# Patient Record
Sex: MALE | Race: WHITE | HISPANIC OR LATINO | Employment: UNEMPLOYED | URBAN - METROPOLITAN AREA
[De-identification: names, ages, dates, MRNs, and addresses within clinical notes are randomized per-mention and may not be internally consistent; named-entity substitution may affect disease eponyms.]

---

## 2019-11-30 ENCOUNTER — OFFICE VISIT (OUTPATIENT)
Dept: URGENT CARE | Facility: PHYSICIAN GROUP | Age: 19
End: 2019-11-30
Payer: COMMERCIAL

## 2019-11-30 VITALS
HEIGHT: 70 IN | DIASTOLIC BLOOD PRESSURE: 76 MMHG | WEIGHT: 180 LBS | TEMPERATURE: 98.8 F | RESPIRATION RATE: 16 BRPM | SYSTOLIC BLOOD PRESSURE: 108 MMHG | HEART RATE: 92 BPM | BODY MASS INDEX: 25.77 KG/M2 | OXYGEN SATURATION: 98 %

## 2019-11-30 DIAGNOSIS — B07.9 VIRAL WARTS, UNSPECIFIED TYPE: ICD-10-CM

## 2019-11-30 DIAGNOSIS — K29.00 ACUTE GASTRITIS, PRESENCE OF BLEEDING UNSPECIFIED, UNSPECIFIED GASTRITIS TYPE: ICD-10-CM

## 2019-11-30 PROCEDURE — 99202 OFFICE O/P NEW SF 15 MIN: CPT | Performed by: PHYSICIAN ASSISTANT

## 2019-11-30 RX ORDER — FAMOTIDINE 20 MG/1
20 TABLET, FILM COATED ORAL 2 TIMES DAILY
Qty: 60 TAB | Refills: 0 | Status: SHIPPED | OUTPATIENT
Start: 2019-11-30

## 2019-11-30 ASSESSMENT — ENCOUNTER SYMPTOMS
BACK PAIN: 0
MYALGIAS: 0
SORE THROAT: 0
SINUS PAIN: 0
ABDOMINAL PAIN: 0
DIARRHEA: 1
VOMITING: 1
FEVER: 0
BLOOD IN STOOL: 1
COUGH: 0
PALPITATIONS: 0
HEARTBURN: 1
HEADACHES: 0
CHILLS: 0
DIZZINESS: 0
FLANK PAIN: 0
SPUTUM PRODUCTION: 0
CONSTIPATION: 0
DOUBLE VISION: 0
BLURRED VISION: 0
NAUSEA: 1
SHORTNESS OF BREATH: 0

## 2019-11-30 NOTE — PROGRESS NOTES
Subjective:      Denzel Andrews Jr. is a 19 y.o. male who presents with Emesis (Possible acid reflux causing him to throw up bile after eating onoing several weeks.)            HPI   18 y/o male presents to urgent care with new problem of intermittent nausea and vomiting onset about 3 weeks ago.  Patient denies abdominal pain.  Reports symptoms are worse with eating fatty/greasy foods. Denies hx of similar.   Reports hx of intermittent dark red blood in stools. Denies recent travel outside the [place], camping, or drinking from streams.   Denies abdominal pain. Denies hx of abdominal surgery.   Family hx of pancreatic cancer.   Denies other associated aggravating or alleviating factors.     Review of Systems   Constitutional: Negative for chills, fever and malaise/fatigue.   HENT: Negative for ear discharge, ear pain, sinus pain and sore throat.    Eyes: Negative for blurred vision and double vision.   Respiratory: Negative for cough, sputum production and shortness of breath.    Cardiovascular: Negative for chest pain and palpitations.   Gastrointestinal: Positive for blood in stool, diarrhea, heartburn, nausea and vomiting. Negative for abdominal pain, constipation and melena.   Genitourinary: Negative for dysuria, flank pain, frequency, hematuria and urgency.   Musculoskeletal: Negative for back pain and myalgias.   Skin: Negative for rash.   Neurological: Negative for dizziness and headaches.   Endo/Heme/Allergies: Negative for environmental allergies.     History reviewed. No pertinent past medical history.  No current outpatient medications on file prior to visit.     No current facility-administered medications on file prior to visit.      No Known Allergies  Social History     Tobacco Use   • Smoking status: Never Smoker   • Smokeless tobacco: Never Used   Substance Use Topics   • Alcohol use: Not on file      Objective:     /76   Pulse 92   Temp 37.1 °C (98.8 °F) (Temporal)   Resp 16   Ht 1.778 m  "(5' 10\")   Wt 81.6 kg (180 lb)   SpO2 98%   BMI 25.83 kg/m²      Physical Exam  Vitals signs reviewed.   Constitutional:       Appearance: He is well-developed.   HENT:      Head: Normocephalic and atraumatic.      Nose: Nose normal.      Mouth/Throat:      Mouth: Mucous membranes are moist.      Pharynx: Oropharynx is clear.   Eyes:      Conjunctiva/sclera: Conjunctivae normal.   Neck:      Musculoskeletal: Normal range of motion and neck supple.   Cardiovascular:      Rate and Rhythm: Normal rate and regular rhythm.      Pulses: Normal pulses.      Heart sounds: Normal heart sounds.   Pulmonary:      Effort: Pulmonary effort is normal. No respiratory distress.      Breath sounds: Normal breath sounds.   Abdominal:      General: Abdomen is flat. Bowel sounds are normal. There is no distension.      Palpations: Abdomen is soft. There is no hepatomegaly, splenomegaly or mass.      Tenderness: There is no tenderness. There is no right CVA tenderness, left CVA tenderness or guarding. Negative signs include McBurney's sign.   Genitourinary:     Comments: Patient defers rectal exam for guaiac testing  Skin:     General: Skin is warm and dry.      Capillary Refill: Capillary refill takes less than 2 seconds.   Neurological:      General: No focal deficit present.      Mental Status: He is alert and oriented to person, place, and time.   Psychiatric:         Mood and Affect: Mood normal.         Behavior: Behavior normal.         Thought Content: Thought content normal.         Judgment: Judgment normal.               Assessment/Plan:     1. Acute gastritis, presence of bleeding unspecified, unspecified gastritis type  famotidine (PEPCID) 20 MG Tab   2. Viral warts, unspecified type  REFERRAL TO DERMATOLOGY     Patient advised to stay away from trigger foods, alcohol, and caffeine.  Do not eat within 3 hours of going to bed.  No NSAIDs.  PT should follow up with PCP in 1-2 days for re-evaluation if symptoms have not " improved.  Discussed red flags and reasons to return to UC or ED.  Pt and/or family verbalized understanding of diagnosis and follow up instructions and was offered informational handout on diagnosis.  PT discharged.

## 2019-12-10 ENCOUNTER — APPOINTMENT (RX ONLY)
Dept: URBAN - METROPOLITAN AREA CLINIC 22 | Facility: CLINIC | Age: 19
Setting detail: DERMATOLOGY
End: 2019-12-10

## 2019-12-10 DIAGNOSIS — D22 MELANOCYTIC NEVI: ICD-10-CM

## 2019-12-10 DIAGNOSIS — B07.8 OTHER VIRAL WARTS: ICD-10-CM

## 2019-12-10 DIAGNOSIS — Z71.89 OTHER SPECIFIED COUNSELING: ICD-10-CM

## 2019-12-10 PROBLEM — D22.5 MELANOCYTIC NEVI OF TRUNK: Status: ACTIVE | Noted: 2019-12-10

## 2019-12-10 PROCEDURE — ? COUNSELING

## 2019-12-10 PROCEDURE — ? LIQUID NITROGEN

## 2019-12-10 PROCEDURE — 17111 DESTRUCTION B9 LESIONS 15/>: CPT

## 2019-12-10 PROCEDURE — 99202 OFFICE O/P NEW SF 15 MIN: CPT | Mod: 25

## 2019-12-10 ASSESSMENT — LOCATION DETAILED DESCRIPTION DERM
LOCATION DETAILED: RIGHT DISTAL DORSAL INDEX FINGER
LOCATION DETAILED: LEFT LATERAL DORSAL FOOT
LOCATION DETAILED: LEFT MEDIAL SUPERIOR CHEST
LOCATION DETAILED: LEFT LATERAL PLANTAR MIDFOOT
LOCATION DETAILED: LEFT DISTAL RADIAL THUMB
LOCATION DETAILED: PERIUNGUAL SKIN RIGHT INDEX FINGER
LOCATION DETAILED: LEFT 5TH TOENAIL
LOCATION DETAILED: LEFT THENAR EMINENCE
LOCATION DETAILED: RIGHT LATERAL PLANTAR MIDFOOT
LOCATION DETAILED: LEFT INFERIOR UPPER BACK
LOCATION DETAILED: RIGHT DISTAL PALMAR INDEX FINGER
LOCATION DETAILED: RIGHT ULNAR PALM
LOCATION DETAILED: LEFT DORSAL 5TH TOE
LOCATION DETAILED: LEFT DORSAL 4TH TOE

## 2019-12-10 ASSESSMENT — LOCATION SIMPLE DESCRIPTION DERM
LOCATION SIMPLE: LEFT UPPER BACK
LOCATION SIMPLE: LEFT THUMB
LOCATION SIMPLE: LEFT FOOT
LOCATION SIMPLE: CHEST
LOCATION SIMPLE: LEFT HAND
LOCATION SIMPLE: RIGHT INDEX FINGER
LOCATION SIMPLE: LEFT 4TH TOE
LOCATION SIMPLE: LEFT PLANTAR SURFACE
LOCATION SIMPLE: LEFT 5TH TOE
LOCATION SIMPLE: RIGHT PLANTAR SURFACE
LOCATION SIMPLE: RIGHT HAND

## 2019-12-10 ASSESSMENT — LOCATION ZONE DERM
LOCATION ZONE: TOENAIL
LOCATION ZONE: HAND
LOCATION ZONE: FINGER
LOCATION ZONE: TRUNK
LOCATION ZONE: TOE
LOCATION ZONE: FEET

## 2020-05-06 ENCOUNTER — NON-PROVIDER VISIT (OUTPATIENT)
Dept: URGENT CARE | Facility: PHYSICIAN GROUP | Age: 20
End: 2020-05-06

## 2020-05-06 DIAGNOSIS — Z02.1 PRE-EMPLOYMENT DRUG SCREENING: ICD-10-CM

## 2020-05-06 LAB
AMP AMPHETAMINE: NORMAL
COC COCAINE: NORMAL
INT CON NEG: NEGATIVE
INT CON POS: POSITIVE
MET METHAMPHETAMINES: NORMAL
OPI OPIATES: NORMAL
PCP PHENCYCLIDINE: NORMAL
POC DRUG COMMENT 753798-OCCUPATIONAL HEALTH: NEGATIVE
THC: NORMAL

## 2020-05-06 PROCEDURE — 80305 DRUG TEST PRSMV DIR OPT OBS: CPT | Performed by: NURSE PRACTITIONER

## 2020-06-25 ENCOUNTER — OFFICE VISIT (OUTPATIENT)
Dept: URGENT CARE | Facility: PHYSICIAN GROUP | Age: 20
End: 2020-06-25
Payer: COMMERCIAL

## 2020-06-25 VITALS
SYSTOLIC BLOOD PRESSURE: 126 MMHG | OXYGEN SATURATION: 96 % | TEMPERATURE: 97.5 F | HEART RATE: 84 BPM | DIASTOLIC BLOOD PRESSURE: 72 MMHG

## 2020-06-25 DIAGNOSIS — J00 COMMON COLD: ICD-10-CM

## 2020-06-25 DIAGNOSIS — K21.9 GASTROESOPHAGEAL REFLUX DISEASE, ESOPHAGITIS PRESENCE NOT SPECIFIED: ICD-10-CM

## 2020-06-25 PROCEDURE — 99213 OFFICE O/P EST LOW 20 MIN: CPT | Performed by: PHYSICIAN ASSISTANT

## 2020-06-25 ASSESSMENT — ENCOUNTER SYMPTOMS
MYALGIAS: 0
NAUSEA: 0
FEVER: 0
DIARRHEA: 1
HEADACHES: 1
HEARTBURN: 1
ABDOMINAL PAIN: 1
VOMITING: 0
ANOREXIA: 0

## 2020-06-25 NOTE — LETTER
June 25, 2020         Patient: Denzel Andrews Jr.   YOB: 2000   Date of Visit: 6/25/2020           To Whom it May Concern:    Denzel Andrews was seen in my clinic on 6/25/2020. Patient reports getting over recent cold. Has been asymptomatic for the past 3 days, no fever or shortness of breath over the duration of his illness. Low suspicion of COVID. Discussed potential symptoms that would indicate otherwise. He can return to work now as long as no new symptoms occur.  If you have any questions or concerns, please don't hesitate to call.        Sincerely,           Arturo Kaplan P.A.-C.  Electronically Signed

## 2020-06-25 NOTE — PROGRESS NOTES
Subjective:   Denzel Andrews Jr. is a 20 y.o. male who presents today with   Chief Complaint   Patient presents with   • Flank Pain     left upper side pain x 4 days,needs not to return to work fron minor cold last week       LUQ Pain   This is a new problem. The current episode started 1 to 4 weeks ago. The problem occurs rarely. The problem has been resolved. The pain is located in the LUQ. The pain is moderate. The quality of the pain is aching. The abdominal pain does not radiate. Associated symptoms include diarrhea (resolved) and headaches. Pertinent negatives include no anorexia, fever, frequency, myalgias, nausea or vomiting. Nothing aggravates the pain. The pain is relieved by nothing. He has tried nothing for the symptoms. The treatment provided no relief.     Patient was seen a while back in  and given GERD medication. States symptoms have gotten better but did have one episode of heartburn while sick. Was told to monitor for any abdominal pain. He states he had one episode of LUQ pain with not trigger and it went away quickly. No other symptoms. Patient is mainly looking for note to go back to work as he states he had a cold starting last week. He has been asymptomatic over the past 3 days. No fever, shortness of breath or cough. No direct  Contact with COVID + patient.   PMH:  has no past medical history on file.  MEDS:   Current Outpatient Medications:   •  famotidine (PEPCID) 20 MG Tab, Take 1 Tab by mouth 2 times a day. (Patient not taking: Reported on 6/25/2020), Disp: 60 Tab, Rfl: 0  ALLERGIES: No Known Allergies  SURGHX: History reviewed. No pertinent surgical history.  SOCHX:  reports that he has never smoked. He has never used smokeless tobacco.  FH: Reviewed with patient, not pertinent to this visit.       Review of Systems   Constitutional: Negative for fever.   HENT: Positive for congestion (resolved).    Gastrointestinal: Positive for abdominal pain (rarely), diarrhea (resolved) and  heartburn (rarely). Negative for anorexia, nausea and vomiting.   Genitourinary: Negative for frequency.   Musculoskeletal: Negative for myalgias.   Neurological: Positive for headaches.   All other systems reviewed and are negative.       Objective:   /72 (BP Location: Right arm, Patient Position: Sitting, BP Cuff Size: Adult)   Pulse 84   Temp 36.4 °C (97.5 °F) (Temporal)   SpO2 96%   Physical Exam  Vitals signs and nursing note reviewed.   Constitutional:       General: He is not in acute distress.     Appearance: Normal appearance. He is well-developed and normal weight. He is not ill-appearing or toxic-appearing.   HENT:      Head: Normocephalic and atraumatic.      Right Ear: Hearing normal.      Left Ear: Hearing normal.   Eyes:      Pupils: Pupils are equal, round, and reactive to light.   Cardiovascular:      Rate and Rhythm: Normal rate and regular rhythm.      Heart sounds: Normal heart sounds.   Pulmonary:      Effort: Pulmonary effort is normal.   Abdominal:      Tenderness: There is abdominal tenderness (very mild) in the left upper quadrant. There is no right CVA tenderness, left CVA tenderness or guarding. Negative signs include Chaves's sign and McBurney's sign.   Musculoskeletal:      Comments: Normal movement in all 4 extremities   Skin:     General: Skin is warm and dry.   Neurological:      Mental Status: He is alert.      Coordination: Coordination normal.   Psychiatric:         Mood and Affect: Mood normal.           Assessment/Plan:   Assessment    1. Common cold  No findings that would warrant a workup today. Given work note after discussing CDC guidelines. Continue monitoring GERD and abdominal symptoms.   Differential diagnosis, natural history, supportive care, and indications for immediate follow-up discussed.   Patient given instructions and understanding of medications and treatment.    If not improving in 3-5 days, F/U with PCP or return to UC if symptoms worsen.    Patient  agreeable to plan.    Please note that this dictation was created using voice recognition software. I have made every reasonable attempt to correct obvious errors, but I expect that there are errors of grammar and possibly content that I did not discover before finalizing the note.    Arturo Kaplan PA-C